# Patient Record
Sex: FEMALE | Race: WHITE | NOT HISPANIC OR LATINO | URBAN - METROPOLITAN AREA
[De-identification: names, ages, dates, MRNs, and addresses within clinical notes are randomized per-mention and may not be internally consistent; named-entity substitution may affect disease eponyms.]

---

## 2020-06-24 NOTE — PROCEDURE NOTE: CLINICAL
PROCEDURE NOTE: Focal Laser, Choroid OS. Diagnosis: Neovascular AMD with Active CNV. Anesthesia: Topical. Prior to focal laser, risks/benefits/alternatives to laser discussed including scotoma and loss of vision and patient wished to proceed. A written consent is on file, and the need for today’s laser was discussed and the patient is understanding and wishes to proceed. Spot size: 200* um. Pulse power: 200* mW. Number of pulses: 149*. Duration: 100* ms. Procedure Time: 11:44am*. Patient tolerated procedure well. There were no complications. Post procedure instructions given. Patient given office phone number/answering service number and advised to call immediately should there be loss of vision or pain, or should they have any other questions or concerns. Louisa Garner

## 2020-11-25 NOTE — PROCEDURE NOTE: CLINICAL
PROCEDURE NOTE: Eylea PFS OS. Diagnosis: Neovascular AMD with Active CNV. Anesthesia: Topical/Subconjunctival. Prep: Betadine Drops. Prior to injection, risks/benefits/alternatives discussed including but not limited to infection, loss of vision or eye, hemorrhage, cataract, glaucoma, retinal tears or detachment. The patient wished to proceed with treatment. Betadine prep was performed. Topical anesthesia was induced with Alcaine. Additional anesthesia was achieved using drop(s) or injection checked above. A drop of Povidone-iodine 5% ophthalmic solution was instilled over the injection site and in the inferior fornix. A single use prefilled syringe of intravitreal Eylea 2mg/0.05ml was used and excess was disposed of as waste. The needle was passed 3.0 mm posterior to the limbus in pseudophakic patients, and 3.5 mm posterior to the limbus in phakic patients. Injection time: *. Patient tolerated procedure well. There were no complications. The eye was irrigated with sterile irrigating solution. Post procedure instructions given. The patient was instructed to use Artificial Tears q.i.d. p.r.n for comfort. The patient was instructed to return for re-evaluation in approximately 4-12 weeks depending on his/her condition and was told to call immediately if vision decreases and/or if his/her eye becomes red, painful, and/or light sensitive. The patient was instructed to go to the emergency room or call 911 if unable to reach the doctor within an hour or two of trying or calling. Shanell Jimenes

## 2020-11-25 NOTE — PATIENT DISCUSSION
11/25/20 WORSENED WITH FLUID IN MACULAR AREA , EYLEA 2 MG TODAY THEN FOLLOW IN [facility] TO DR GRIMES AND [facility].

## 2022-02-19 NOTE — PATIENT DISCUSSION
Does NOT APPEAR VISUALLY SIGNIFICANT. <-------- Click here to INCLUDE CoVID-19 Discharge Instructions

## 2022-03-11 ENCOUNTER — CLINIC PROCEDURE ONLY (OUTPATIENT)
Dept: URBAN - METROPOLITAN AREA CLINIC 14 | Facility: CLINIC | Age: 72
End: 2022-03-11

## 2022-03-11 DIAGNOSIS — L98.8: ICD-10-CM

## 2022-03-11 PROCEDURE — 11951 SUBQ NJX FIL MATRL 1.1-5.0CC: CPT

## 2022-03-11 ASSESSMENT — VISUAL ACUITY
OD_SC: 20/25
OS_SC: 20/25